# Patient Record
Sex: FEMALE | URBAN - METROPOLITAN AREA
[De-identification: names, ages, dates, MRNs, and addresses within clinical notes are randomized per-mention and may not be internally consistent; named-entity substitution may affect disease eponyms.]

---

## 2020-03-15 ENCOUNTER — VIRTUAL VISIT (OUTPATIENT)
Dept: FAMILY MEDICINE | Facility: OTHER | Age: 30
End: 2020-03-15

## 2020-03-16 ENCOUNTER — VIRTUAL VISIT (OUTPATIENT)
Dept: FAMILY MEDICINE | Facility: OTHER | Age: 30
End: 2020-03-16

## 2020-03-19 NOTE — PROGRESS NOTES
"Date: 03/15/2020 19:30:27  Clinician: Lizeth Arciniega  Clinician NPI: [id number]  Patient: Hien Elder  Patient : 1972-10-08  Patient Address: [address] [place] [address]  Patient Phone: [phone]  Visit Protocol: URI  Patient Summary:  Hien is a 47 year old ( : 1972-10-08 ) female who initiated a Visit for COVID-19 (Coronavirus) evaluation and screening. When asked the question \"Please sign me up to receive news, health information and promotions from [employer].\", Hien responded \"No\".    Hien states her symptoms started gradually 3-6 days ago.   Her symptoms consist of ear pain, rhinitis, enlarged lymph nodes, chills, and a sore throat.   Symptom details     Nasal secretions: The color of her mucus is clear.    Sore throat: Hien reports having mild throat pain (1-3 on a 10 point pain scale), does not have exudate on her tonsils, and can swallow liquids. The lymph nodes in her neck are enlarged. A rash has not appeared on the skin since the sore throat started.      Hien denies having malaise, fever, headache, facial pain or pressure, myalgias, wheezing, cough, nasal congestion, and teeth pain. She also denies having recent facial or sinus surgery in the past 60 days, double sickening (worsening symptoms after initial improvement), and taking antibiotic medication for the symptoms. She is not experiencing dyspnea.   Precipitating events  Within the past week, Hien has been exposed to someone with strep throat. She has not recently been exposed to someone with influenza. Hien has not been in close contact with any high risk individuals.   Pertinent COVID-19 (Coronavirus) information  Hien has not traveled internationally or to the areas where COVID-19 (Coronavirus) is widespread in the last 14 days before the start of her symptoms.   Hien has not had close contact with a suspected or laboratory-confirmed COVID-19 patient within 14 days of symptom onset.   Hien is not a healthcare " worker and does not work in a healthcare facility.   Pertinent medical history  Hien does not get yeast infections when she takes antibiotics.   Hien does not need a return to work/school note.   Weight: 131 lbs   Hien does not smoke or use smokeless tobacco.   She denies pregnancy and denies breastfeeding. She has menstruated in the past month.   Additional information as reported by the patient (free text): My  traveled to [place] 2/26-2/29. He has slight runny nose and cough but that may have pre-dated his trip. My daughter tested positive for strep on TH 3/12. My throat was scratchy that day so I got tested for strep as well. Both rapid strep test and culture were negative. On 3/13-3/14 periodically felt slight tingling or burning in chest (like breathing in cold air). Today felt slightly chilled. Had temp of 100.6 at 3:30pm using ear thermometer. Currently only 100.1   Weight: 131 lbs    MEDICATIONS: Junel 1/20 (21) oral, ALLERGIES: NKDA  Clinician Response:  Dear Hien,  Based on the information provided, you have a viral upper respiratory infection, otherwise known as a cold. Symptoms vary from person to person, but can include sneezing, coughing, a runny nose, sore throat, and headache and range from mild to severe.  Unfortunately, there are no medications that can cure a cold, so treatment is focused on controlling symptoms as much as possible. Most people gradually feel better until symptoms are gone in 1-2 weeks.  Medication information  Because you have a viral infection, antibiotics will not help you get better. Treating a viral infection with antibiotics could actually make you feel worse.  Unless you are allergic to the over-the-counter medication(s) below, I recommend using:       Acetaminophen (Tylenol or store brand) oral tablet. Take 1-2 tablets by mouth every 4-6 hours to help with the discomfort.      Ibuprofen (Advil or store brand) 200 mg oral tablet. Take 1-3 tablets  (200-600 mg) by mouth every 8 hours to help with the discomfort. Make sure to take the ibuprofen with food. Do not exceed 2400 mg in 24 hours.      Oxymetazoline (Afrin or store brand) nasal spray. Use 1 spray in each nostril 2 times a day for a maximum of 3 days. Using this medication more frequently or longer than recommended may cause nasal congestion to reoccur or worsen. Sinus pressure occurs when the tissues lining your sinuses become swollen and inflamed. Afrin nasal spray decreases the swelling to provide the quickest and most effective relief from sinus pressure.      Over-the-counter medications do not require a prescription. Ask the pharmacist if you have any questions.  Self care  The following tips will keep you as comfortable as possible while you recover:     Rest    Drink plenty of water and other liquids    Take a hot shower to loosen congestion    Use throat lozenges    Gargle with warm salt water (1/4 teaspoon of salt per 8 ounce glass of water)    Suck on frozen items such as popsicles or ice cubes    Drink hot tea with lemon and honey     When to seek care  Please be seen in a clinic or urgent care if new symptoms develop, or symptoms become worse.  Call 911 or go to the emergency room if you feel that your throat is closing off, you suddenly develop a rash, you are unable to swallow fluids, you are drooling, or you are having difficulty breathing.  Additional treatment plan   Dear Hien,  Based on the information you have provided, it does not appear you need Coronavirus (COVID-19) testing.   At this time, we recommend testing primarily for those people who have symptoms of cough and fever and have either traveled to a known area of infection or have been exposed to someone with laboratory confirmed Coronavirus by close contact.   Coronavirus - General Information:   The coronavirus infection starts within 14 days of an exposure.  Symptoms are those of a respiratory infection (such as fever,  cough).   If you have not had symptoms by day 15, you should be considered uninfected by coronavirus.   Coronavirus - Symptoms:    The coronavirus can cause a respiratory illness, such as bronchitis or pneumonia.  The most common symptoms are: cough, fever, and shortness of breath.   Other symptoms are: body aches, chills, diarrhea, fatigue, headache, runny nose, and sore throat   Coronavirus - Exposure Risk Factors:   Exposure to a person who has been diagnosed with coronavirus.  Travel from an area with recent local transmission of coronavirus.  The CDC (www.cdc.gov) has the most up-to-date list of where the coronavirus outbreak is occurring.   Coronavirus - Spreading:    The virus likely spreads through respiratory droplets produced when a person coughs or sneezes. These respiratory droplets can travel approximately 6 feet and can remain on surfaces. Common disinfectants will kill the virus.  The CDC currently does not recommend healthy people wear masks.   Coronavirus - Protect Yourself:    Avoid close contact with people known to have this new coronavirus infection.  Wash hands often with soap and water or alcohol-based hand .  Avoid touching the eyes, nose or mouth.   Thank you for limiting contact with others, wearing a simple mask to cover your cough, practice good hand hygiene habits and accessing our virtual services where possible to limit the spread of this virus.  For more information about COVID19 and options for caring for yourself at home, please visit the CDC website at https://www.cdc.gov/coronavirus/2019-ncov/about/steps-when-sick.html   For more options for care at [facility], please visit our website at [email or web address]        Diagnosis: Acute upper respiratory infection, unspecified  Diagnosis ICD: J06.9  Additional Clinician Notes: You can also try gargling with Chloraseptic spray (or generic) to help with your sore throat.

## 2020-03-19 NOTE — PROGRESS NOTES
"Date: 2020 18:01:49  Clinician: Mino Rae  Clinician NPI: [id number]  Patient: Jyoti Piedra  Patient : 1990  Patient Address: [facility] [place] [address]  Patient Phone: [phone]  Visit Protocol: URI  Patient Summary:  Jyoti is a 29 year old ( : 1990 ) female who initiated a Visit for COVID-19 (Coronavirus) evaluation and screening. When asked the question \"Please sign me up to receive news, health information and promotions from [employer].\", Jyoti responded \"No\".    Jyoti states her symptoms started gradually 3-6 days ago.   Her symptoms consist of malaise, enlarged lymph nodes, wheezing, and a sore throat. She is experiencing mild difficulty breathing with activities but can speak normally in full sentences.   Symptom details     Sore throat: Jyoti reports having mild throat pain (1-3 on a 10 point pain scale), does not have exudate on her tonsils, and can swallow liquids. The lymph nodes in her neck are enlarged. A rash has not appeared on the skin since the sore throat started.     Wheezing: Jyoti has not ever been diagnosed with asthma. The wheezing does not interfere with her normal daily activities.     Jyoti denies having ear pain, headache, rhinitis, facial pain or pressure, myalgias, cough, nasal congestion, teeth pain, fever, and chills. She also denies having recent facial or sinus surgery in the past 60 days, double sickening (worsening symptoms after initial improvement), and taking antibiotic medication for the symptoms.   Precipitating events  Within the past week, Jyoti has not been exposed to someone with strep throat.   Pertinent COVID-19 (Coronavirus) information  Jyoti has not traveled internationally or to the areas where COVID-19 (Coronavirus) is widespread in the last 14 days before the start of her symptoms.   Jyoti has had close contact with a suspected or laboratory-confirmed COVID-19 patient within 14 days of " symptom onset.   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   Pertinent medical history  Jyoti does not get yeast infections when she takes antibiotics.   Jyoti does not need a return to work/school note.   Weight: 155 lbs   Jyoti does not smoke or use smokeless tobacco.   She denies pregnancy and denies breastfeeding. She has menstruated in the past month.   Additional information as reported by the patient (free text): Chest tightness and slight burning sensation   Weight: 155 lbs    MEDICATIONS: Allegra-D 24 Hour oral, ALLERGIES: NKDA  Clinician Response:  Dear Jyoti,   Dear Jyoti,  Based on the information you have provided about potential exposure to Coronavirus (Covid-19) but without any symptoms of the virus (cough, fever, shortness of breath are the most common symptoms), it does not appear you need Coronavirus (COVID-19) testing at this time.   But your possible exposure to Coronavirus means that we do recommend self-isolation for 14 days from the last day you may have been exposed.   What does this mean?  Isolate Yourself:   Isolate yourself at home.   Do Not allow any visitors  Do Not go to work or school  Do Not go to [employer],  centers, shopping, or other public places.  Do Not shake hands.  Avoid close contact with others (hugging, kissing).   Protect Others:   Cover Your Mouth and Nose with a mask, disposable tissue or wash cloth to avoid spreading germs to others.  Wash your hands and face frequently with soap and water.   If you have not developed a cough with fever by day 15 of isolation you are considered uninfected.  If you develop cough and fever, submit a new visit to us so we can consider if [facility].   Thank you for limiting contact with others, wearing a simple mask to cover your cough, practice good hand hygiene habits and accessing our virtual services where possible to limit the spread of this virus.   For more information about COVID19 and options for caring for yourself at home, please visit the CDC website at https://www.cdc.gov/coronavirus/2019-ncov/about/steps-when-sick.html  For more options for care at [facility], please visit our website at [email or web address]    Diagnosis: Cough  Diagnosis ICD: R05